# Patient Record
Sex: MALE | Race: WHITE | NOT HISPANIC OR LATINO
[De-identification: names, ages, dates, MRNs, and addresses within clinical notes are randomized per-mention and may not be internally consistent; named-entity substitution may affect disease eponyms.]

---

## 2022-09-27 PROBLEM — Z00.00 ENCOUNTER FOR PREVENTIVE HEALTH EXAMINATION: Status: ACTIVE | Noted: 2022-09-27

## 2022-10-10 ENCOUNTER — APPOINTMENT (OUTPATIENT)
Dept: UROLOGY | Facility: CLINIC | Age: 62
End: 2022-10-10

## 2022-10-10 VITALS
WEIGHT: 145 LBS | DIASTOLIC BLOOD PRESSURE: 94 MMHG | HEIGHT: 69 IN | SYSTOLIC BLOOD PRESSURE: 150 MMHG | BODY MASS INDEX: 21.48 KG/M2 | HEART RATE: 79 BPM | TEMPERATURE: 97.5 F

## 2022-10-10 PROCEDURE — 99203 OFFICE O/P NEW LOW 30 MIN: CPT

## 2022-10-11 LAB — PSA SERPL-MCNC: 5.25 NG/ML

## 2022-10-11 NOTE — HISTORY OF PRESENT ILLNESS
[FreeTextEntry1] : Mr. THOMAS is a 62 year old M with PMHx of bipolar disorder presenting for elevated PSA.  \par PSA value unknown - no records available. \par \par No other urological complaints. He denies LUTS, dysuria , hematuria \par  \par Social history; rare smoker, social ETOH\par Family history: father with PCA?\par Allergies: NKDA \par \par  \par

## 2022-10-11 NOTE — ASSESSMENT
[FreeTextEntry1] : Mr. THOMAS is a 62 year old M with PMHx of bipolar disorder presenting for elevated PSA.  \par Previous PSA values unavailable - records were requested.\par L lower lobe induration on LUIS CARLOS. \par Check PSA today for confirmation.\par Recommend MRI. \par Discussed prostate biopsy if indicated. \par

## 2022-10-11 NOTE — PHYSICAL EXAM
[General Appearance - Well Developed] : well developed [General Appearance - Well Nourished] : well nourished [Normal Appearance] : normal appearance [Well Groomed] : well groomed [General Appearance - In No Acute Distress] : no acute distress [Edema] : no peripheral edema [Respiration, Rhythm And Depth] : normal respiratory rhythm and effort [Exaggerated Use Of Accessory Muscles For Inspiration] : no accessory muscle use [Abdomen Soft] : soft [Abdomen Tenderness] : non-tender [Costovertebral Angle Tenderness] : no ~M costovertebral angle tenderness [Urethral Meatus] : meatus normal [Urinary Bladder Findings] : the bladder was normal on palpation [Scrotum] : the scrotum was normal [Testes Mass (___cm)] : there were no testicular masses [No Prostate Nodules] : no prostate nodules [Normal Station and Gait] : the gait and station were normal for the patient's age [] : no rash [No Focal Deficits] : no focal deficits [Oriented To Time, Place, And Person] : oriented to person, place, and time [Affect] : the affect was normal [Mood] : the mood was normal [Not Anxious] : not anxious [FreeTextEntry1] : L lower lobe induration palpated on LUIS CARLOS

## 2022-10-11 NOTE — END OF VISIT
[FreeTextEntry3] : I, Dr. Ambriz, personally performed the evaluation and management (E/M) services for this new patient who presents today with (a) new problem(s)/exacerbation of (an) existing condition(s).  That E/M includes conducting the examination, assessing all new/exacerbated conditions, and establishing a new plan of care.  Today, our ACP, Keisha Schumacher, was here to observe the evaluation and management services for this new problem/exacerbated condition to be followed going forward.\par

## 2022-11-02 ENCOUNTER — APPOINTMENT (OUTPATIENT)
Dept: MRI IMAGING | Facility: HOSPITAL | Age: 62
End: 2022-11-02

## 2022-11-02 ENCOUNTER — OUTPATIENT (OUTPATIENT)
Dept: OUTPATIENT SERVICES | Facility: HOSPITAL | Age: 62
LOS: 1 days | End: 2022-11-02
Payer: COMMERCIAL

## 2022-11-02 PROCEDURE — A9585: CPT

## 2022-11-02 PROCEDURE — 72197 MRI PELVIS W/O & W/DYE: CPT

## 2022-11-02 PROCEDURE — 72197 MRI PELVIS W/O & W/DYE: CPT | Mod: 26

## 2022-11-07 ENCOUNTER — NON-APPOINTMENT (OUTPATIENT)
Age: 62
End: 2022-11-07

## 2023-04-10 ENCOUNTER — APPOINTMENT (OUTPATIENT)
Dept: UROLOGY | Facility: CLINIC | Age: 63
End: 2023-04-10
Payer: COMMERCIAL

## 2023-04-10 VITALS — HEART RATE: 79 BPM | SYSTOLIC BLOOD PRESSURE: 137 MMHG | TEMPERATURE: 98.3 F | DIASTOLIC BLOOD PRESSURE: 82 MMHG

## 2023-04-10 PROCEDURE — 99213 OFFICE O/P EST LOW 20 MIN: CPT

## 2023-04-10 NOTE — HISTORY OF PRESENT ILLNESS
[FreeTextEntry1] : Mr. THOMAS is a 62 year old M with PMHx of bipolar disorder presenting for elevated PSA.  \par PSA value unknown - no records available. \par \par No other urological complaints. He denies LUTS, dysuria , hematuria \par  \par Social history; rare smoker, social ETOH\par Family history: father with PCA?\par Allergies: NKDA \par \par 4/10/23 Here for f/u. PSA in 10/2022 = 5.25\par Underwent MRI 11/2022: 34gm prostate, no suspicious lesions, PSA density 0.15\par \par  \par  [None] : None

## 2023-04-10 NOTE — ASSESSMENT
[FreeTextEntry1] : 62 year old M with PMHx of bipolar disorder presenting for elevated PSA.  \par PSA = 5 in 10/2022\par Reviewed MRI 11/2022, 35gm prostate, no suspicious lesions, PSAD = 0.15\par Recommend repeat PSA today\par Discussed transperineal biopsy if PSA increases substantially\par F/u 6 months if PSA is stable or decreased \par

## 2023-04-11 LAB — PSA SERPL-MCNC: 6.48 NG/ML

## 2023-04-12 ENCOUNTER — NON-APPOINTMENT (OUTPATIENT)
Age: 63
End: 2023-04-12

## 2023-10-16 ENCOUNTER — APPOINTMENT (OUTPATIENT)
Dept: UROLOGY | Facility: CLINIC | Age: 63
End: 2023-10-16
Payer: COMMERCIAL

## 2023-10-16 VITALS
HEIGHT: 69 IN | WEIGHT: 145 LBS | DIASTOLIC BLOOD PRESSURE: 86 MMHG | BODY MASS INDEX: 21.48 KG/M2 | TEMPERATURE: 98.6 F | HEART RATE: 75 BPM | SYSTOLIC BLOOD PRESSURE: 139 MMHG

## 2023-10-16 DIAGNOSIS — R97.20 ELEVATED PROSTATE, SPECIFIC ANTIGEN [PSA]: ICD-10-CM

## 2023-10-16 PROCEDURE — 99213 OFFICE O/P EST LOW 20 MIN: CPT

## 2023-10-17 LAB — PSA SERPL-MCNC: 5.44 NG/ML

## 2023-10-18 ENCOUNTER — NON-APPOINTMENT (OUTPATIENT)
Age: 63
End: 2023-10-18

## 2024-04-17 ENCOUNTER — APPOINTMENT (OUTPATIENT)
Dept: UROLOGY | Facility: CLINIC | Age: 64
End: 2024-04-17

## 2024-10-07 ENCOUNTER — APPOINTMENT (OUTPATIENT)
Dept: UROLOGY | Facility: CLINIC | Age: 64
End: 2024-10-07
Payer: COMMERCIAL

## 2024-10-07 ENCOUNTER — NON-APPOINTMENT (OUTPATIENT)
Age: 64
End: 2024-10-07

## 2024-10-07 VITALS
HEART RATE: 76 BPM | HEIGHT: 69 IN | DIASTOLIC BLOOD PRESSURE: 81 MMHG | TEMPERATURE: 97.7 F | BODY MASS INDEX: 21.48 KG/M2 | SYSTOLIC BLOOD PRESSURE: 120 MMHG | WEIGHT: 145 LBS

## 2024-10-07 DIAGNOSIS — Z80.42 FAMILY HISTORY OF MALIGNANT NEOPLASM OF PROSTATE: ICD-10-CM

## 2024-10-07 DIAGNOSIS — R97.20 ELEVATED PROSTATE, SPECIFIC ANTIGEN [PSA]: ICD-10-CM

## 2024-10-07 PROCEDURE — 99214 OFFICE O/P EST MOD 30 MIN: CPT

## 2024-10-07 PROCEDURE — G2211 COMPLEX E/M VISIT ADD ON: CPT | Mod: NC

## 2025-03-14 ENCOUNTER — APPOINTMENT (OUTPATIENT)
Dept: MRI IMAGING | Facility: HOSPITAL | Age: 65
End: 2025-03-14

## 2025-03-14 ENCOUNTER — OUTPATIENT (OUTPATIENT)
Dept: OUTPATIENT SERVICES | Facility: HOSPITAL | Age: 65
LOS: 1 days | End: 2025-03-14
Payer: COMMERCIAL

## 2025-03-14 PROCEDURE — A9585: CPT

## 2025-03-14 PROCEDURE — 72197 MRI PELVIS W/O & W/DYE: CPT | Mod: 26

## 2025-03-14 PROCEDURE — 72197 MRI PELVIS W/O & W/DYE: CPT

## 2025-03-24 ENCOUNTER — NON-APPOINTMENT (OUTPATIENT)
Age: 65
End: 2025-03-24

## 2025-03-24 ENCOUNTER — APPOINTMENT (OUTPATIENT)
Dept: UROLOGY | Facility: CLINIC | Age: 65
End: 2025-03-24
Payer: COMMERCIAL

## 2025-03-24 VITALS
BODY MASS INDEX: 21.48 KG/M2 | SYSTOLIC BLOOD PRESSURE: 108 MMHG | WEIGHT: 145 LBS | TEMPERATURE: 97.2 F | HEART RATE: 67 BPM | HEIGHT: 69 IN | DIASTOLIC BLOOD PRESSURE: 62 MMHG

## 2025-03-24 DIAGNOSIS — R97.20 ELEVATED PROSTATE, SPECIFIC ANTIGEN [PSA]: ICD-10-CM

## 2025-03-24 PROCEDURE — G2211 COMPLEX E/M VISIT ADD ON: CPT | Mod: NC

## 2025-03-24 PROCEDURE — 99214 OFFICE O/P EST MOD 30 MIN: CPT

## 2025-04-16 ENCOUNTER — NON-APPOINTMENT (OUTPATIENT)
Age: 65
End: 2025-04-16

## 2025-04-21 ENCOUNTER — APPOINTMENT (OUTPATIENT)
Dept: UROLOGY | Facility: CLINIC | Age: 65
End: 2025-04-21
Payer: COMMERCIAL

## 2025-04-21 ENCOUNTER — NON-APPOINTMENT (OUTPATIENT)
Age: 65
End: 2025-04-21

## 2025-04-21 VITALS
WEIGHT: 145 LBS | TEMPERATURE: 97.7 F | BODY MASS INDEX: 21.48 KG/M2 | DIASTOLIC BLOOD PRESSURE: 78 MMHG | HEIGHT: 69 IN | SYSTOLIC BLOOD PRESSURE: 128 MMHG | HEART RATE: 73 BPM

## 2025-04-21 DIAGNOSIS — R97.20 ELEVATED PROSTATE, SPECIFIC ANTIGEN [PSA]: ICD-10-CM

## 2025-04-21 PROCEDURE — 55700: CPT

## 2025-04-21 PROCEDURE — 76999F: CUSTOM

## 2025-04-22 ENCOUNTER — NON-APPOINTMENT (OUTPATIENT)
Age: 65
End: 2025-04-22

## 2025-04-24 LAB — CORE LAB BIOPSY: NORMAL

## 2025-05-11 PROBLEM — C61 CANCER OF PROSTATE: Status: ACTIVE | Noted: 2025-05-11

## 2025-05-12 ENCOUNTER — APPOINTMENT (OUTPATIENT)
Dept: UROLOGY | Facility: CLINIC | Age: 65
End: 2025-05-12
Payer: COMMERCIAL

## 2025-05-12 VITALS
WEIGHT: 145 LBS | BODY MASS INDEX: 21.48 KG/M2 | HEIGHT: 69 IN | SYSTOLIC BLOOD PRESSURE: 121 MMHG | TEMPERATURE: 98 F | HEART RATE: 74 BPM | DIASTOLIC BLOOD PRESSURE: 68 MMHG

## 2025-05-12 DIAGNOSIS — C61 MALIGNANT NEOPLASM OF PROSTATE: ICD-10-CM

## 2025-05-12 PROCEDURE — 99213 OFFICE O/P EST LOW 20 MIN: CPT

## 2025-08-11 ENCOUNTER — APPOINTMENT (OUTPATIENT)
Dept: UROLOGY | Facility: CLINIC | Age: 65
End: 2025-08-11
Payer: COMMERCIAL

## 2025-08-11 VITALS
TEMPERATURE: 97.6 F | WEIGHT: 145 LBS | SYSTOLIC BLOOD PRESSURE: 99 MMHG | HEIGHT: 69 IN | HEART RATE: 71 BPM | DIASTOLIC BLOOD PRESSURE: 59 MMHG | BODY MASS INDEX: 21.48 KG/M2

## 2025-08-11 DIAGNOSIS — C61 MALIGNANT NEOPLASM OF PROSTATE: ICD-10-CM

## 2025-08-11 PROCEDURE — G2211 COMPLEX E/M VISIT ADD ON: CPT | Mod: NC

## 2025-08-11 PROCEDURE — 99213 OFFICE O/P EST LOW 20 MIN: CPT

## 2025-08-14 ENCOUNTER — NON-APPOINTMENT (OUTPATIENT)
Age: 65
End: 2025-08-14

## 2025-08-14 LAB — PSA SERPL-MCNC: 5.82 NG/ML
